# Patient Record
Sex: MALE | Race: WHITE | URBAN - METROPOLITAN AREA
[De-identification: names, ages, dates, MRNs, and addresses within clinical notes are randomized per-mention and may not be internally consistent; named-entity substitution may affect disease eponyms.]

---

## 2020-03-09 ENCOUNTER — OFFICE VISIT - HEALTHEAST (OUTPATIENT)
Dept: FAMILY MEDICINE | Facility: CLINIC | Age: 45
End: 2020-03-09

## 2020-03-09 DIAGNOSIS — Z76.89 ENCOUNTER TO ESTABLISH CARE: ICD-10-CM

## 2020-03-09 DIAGNOSIS — Z00.00 ENCOUNTER FOR GENERAL ADULT MEDICAL EXAMINATION WITHOUT ABNORMAL FINDINGS: ICD-10-CM

## 2020-03-09 DIAGNOSIS — Z13.220 ENCOUNTER FOR SCREENING FOR LIPOID DISORDERS: ICD-10-CM

## 2020-03-09 DIAGNOSIS — Z11.4 SCREENING FOR HIV (HUMAN IMMUNODEFICIENCY VIRUS): ICD-10-CM

## 2020-03-09 DIAGNOSIS — E78.5 HYPERLIPIDEMIA LDL GOAL <160: ICD-10-CM

## 2020-03-09 DIAGNOSIS — Z90.5 SINGLE KIDNEY: ICD-10-CM

## 2020-03-09 LAB
ALBUMIN SERPL-MCNC: 4.4 G/DL (ref 3.5–5)
ALP SERPL-CCNC: 82 U/L (ref 45–120)
ALT SERPL W P-5'-P-CCNC: 27 U/L (ref 0–45)
ANION GAP SERPL CALCULATED.3IONS-SCNC: 12 MMOL/L (ref 5–18)
AST SERPL W P-5'-P-CCNC: 16 U/L (ref 0–40)
BILIRUB SERPL-MCNC: 0.6 MG/DL (ref 0–1)
BUN SERPL-MCNC: 17 MG/DL (ref 8–22)
CALCIUM SERPL-MCNC: 10 MG/DL (ref 8.5–10.5)
CHLORIDE BLD-SCNC: 105 MMOL/L (ref 98–107)
CHOLEST SERPL-MCNC: 260 MG/DL
CO2 SERPL-SCNC: 24 MMOL/L (ref 22–31)
CREAT SERPL-MCNC: 0.93 MG/DL (ref 0.7–1.3)
FASTING STATUS PATIENT QL REPORTED: YES
GFR SERPL CREATININE-BSD FRML MDRD: >60 ML/MIN/1.73M2
GLUCOSE BLD-MCNC: 113 MG/DL (ref 70–125)
HDLC SERPL-MCNC: 39 MG/DL
HGB BLD-MCNC: 17.4 G/DL (ref 14–18)
HIV 1+2 AB+HIV1 P24 AG SERPL QL IA: NEGATIVE
LDLC SERPL CALC-MCNC: 167 MG/DL
POTASSIUM BLD-SCNC: 4.3 MMOL/L (ref 3.5–5)
PROT SERPL-MCNC: 7.6 G/DL (ref 6–8)
SODIUM SERPL-SCNC: 141 MMOL/L (ref 136–145)
TRIGL SERPL-MCNC: 272 MG/DL

## 2020-03-09 ASSESSMENT — MIFFLIN-ST. JEOR: SCORE: 2139.98

## 2020-03-10 ENCOUNTER — COMMUNICATION - HEALTHEAST (OUTPATIENT)
Dept: FAMILY MEDICINE | Facility: CLINIC | Age: 45
End: 2020-03-10

## 2020-03-11 ENCOUNTER — COMMUNICATION - HEALTHEAST (OUTPATIENT)
Dept: FAMILY MEDICINE | Facility: CLINIC | Age: 45
End: 2020-03-11

## 2020-06-30 ENCOUNTER — TRANSFERRED RECORDS (OUTPATIENT)
Dept: HEALTH INFORMATION MANAGEMENT | Facility: CLINIC | Age: 45
End: 2020-06-30

## 2020-11-06 ENCOUNTER — TRANSFERRED RECORDS (OUTPATIENT)
Dept: HEALTH INFORMATION MANAGEMENT | Facility: CLINIC | Age: 45
End: 2020-11-06

## 2021-06-04 VITALS
WEIGHT: 277.5 LBS | OXYGEN SATURATION: 97 % | BODY MASS INDEX: 39.73 KG/M2 | DIASTOLIC BLOOD PRESSURE: 99 MMHG | HEIGHT: 70 IN | SYSTOLIC BLOOD PRESSURE: 148 MMHG | HEART RATE: 62 BPM

## 2021-06-06 NOTE — TELEPHONE ENCOUNTER
Test Results  Who is calling?:  patient  Who ordered the test:  Fabienne Caldwell FNP  Type of test: Lab  Date of test:  3/20/20  Where was the test performed:  [facility]  What are your questions/concerns?:  Requesting results, relayed below results    Normal lab results except for elevated total cholesterol, triglycerides, HDL and LDL. I will recommend a mild to moderate dose statins. I will send this to your pharmacy and plan to recheck in 12 months.      Please call with questions or contact us using [employer].     Sincerely,          Electronically signed by CHARLENE Durant    Okay to leave a detailed message?:  Yes

## 2021-06-06 NOTE — PROGRESS NOTES
MALE PREVENTATIVE EXAM    Assessment and Plan:   1. Encounter to establish care  2. Encounter for general adult medical examination without abnormal findings  Healthy male exam  - Comprehensive Metabolic Panel  - Hemoglobin    3. Encounter for screening for lipoid disorders  - Lipid Cascade- FASTING    4. Single kidney  Plan to check renal function today .     5. Screening for HIV (human immunodeficiency virus)  - HIV Antigen/Antibody Screening [place]     Next follow up:  No follow-ups on file.    Immunization Review  Adult Imm Review: Due today, orders placed    I discussed the following with the patient:   Adult Healthy Living: Importance of regular exercise  Healthy nutrition  Getting adequate sleep  Stress management  Use of seat belts  Distracted driving  Helmets  Sporting equipment safety  Firearm safety  STI prevention  Contraception options  Supplement use  Herbal medications/alternative medical therapies    I have had an Advance Directives discussion with the patient.    Subjective:   Chief Complaint: Brandon Joy is an 45 y.o. male here for a preventative health visit.     HPI:  Patient reports that he is doing well but he has been told that his knees are bone to bone and that he will be needing surgery. He was given cortisone shot few weeks ago and he is not sure how long it going to last. 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 He denied any recent exposure to any form of illness. He denied chest pain, shortness of breath, fever and chills.     Healthy Habits  Are you taking a daily aspirin? No  Do you typically exercising at least 40 min, 3-4 times per week?  Yes  Do you usually eat at least 4 servings of fruit and vegetables a day, include whole grains and fiber and avoid regularly eating high fat foods? yes  Have you had an eye exam in the past two years? Yes  Do you see a dentist twice per year? NO  Do you have any concerns regarding sleep? No    Safety Screen  If you own firearms, are they  "secured in a locked gun cabinet or with trigger locks? The patient does not own any firearms  No data recorded    Review of Systems:  Please see above.  The rest of the review of systems are negative for all systems.     Cancer Screening     Patient has no health maintenance due at this time          Patient Care Team:  Fabienne Caldwell FNP as PCP - General (Nurse Practitioner)        History     Not marked as reviewed during this visit.            Objective:   Vital Signs: There were no vitals taken for this visit.       PHYSICAL EXAM  BP (!) 148/99   Pulse 62   Ht 5' 10\" (1.778 m)   Wt (!) 277 lb 8 oz (125.9 kg)   SpO2 97%   BMI 39.82 kg/m    General appearance: alert, appears stated age and cooperative  Head: Normocephalic, without obvious abnormality, atraumatic  Eyes: conjunctivae/corneas clear. PERRL, EOM's intact. Fundi benign.  Ears: normal TM's and external ear canals both ears  Throat: lips, mucosa, and tongue normal; teeth and gums normal  Neck: no adenopathy, no carotid bruit, no JVD, supple, symmetrical, trachea midline and thyroid not enlarged, symmetric, no tenderness/mass/nodules  Back: symmetric, no curvature. ROM normal. No CVA tenderness.  Lungs: clear to auscultation bilaterally  Chest wall: no tenderness  Heart: regular rate and rhythm, S1, S2 normal, no murmur, click, rub or gallop  Abdomen: soft, non-tender; bowel sounds normal; no masses,  no organomegaly  Male genitalia: normal, deferred  Rectal: deferred  Extremities: extremities normal, atraumatic, no cyanosis or edema  Pulses: 2+ and symmetric  Skin: Skin color, texture, turgor normal. No rashes or lesions  Lymph nodes: Cervical, supraclavicular, and axillary nodes normal.  Neurologic: Grossly normal      The ASCVD Risk score (Natalie DC Jr., et al., 2013) failed to calculate for the following reasons:    Cannot find a previous HDL lab    Cannot find a previous total cholesterol lab         Medication List      as of March 9, 2020  " 9:28 AM     You have not been prescribed any medications.         Additional Screenings Completed Today:

## 2021-06-18 NOTE — PATIENT INSTRUCTIONS - HE
Patient Instructions by Fabienne Caldwell FNP at 3/9/2020  8:10 AM     Author: Fabienne Caldwell FNP Service: -- Author Type: Nurse Practitioner    Filed: 3/9/2020  8:33 AM Encounter Date: 3/9/2020 Status: Signed    : Fabienne Caldwell FNP (Nurse Practitioner)           Preventing Skin Cancer     Use sunscreen of SPF 30 or greater. Apply liberally.   Relaxing in the sun may feel good. But it isnt good for your skin. In fact, the suns harmful rays are the major cause of skin cancer. This is a serious disease that can be life-threatening. People of all ages, races, and backgrounds are at risk.  Skin cancer is the most common cancer in the [place] But in most cases, it can be prevented.  Your role in prevention  You can act today to help prevent skin cancer. Start by avoiding the suns UV (ultraviolet) rays. And dont use tanning beds or lamps. They are no safer than the sun. Taking these steps can help keep you from getting skin cancer. It can also help prevent wrinkles and other aging effects caused by the sun. Make sure your children also follow these safeguards. Now is the time to start taking steps to prevent skin cancer.  When you are outdoors  Protect your skin when you go out during the day. Take safety steps whenever you go out to eat, run errands by car or on foot, or do any outdoor activity. There isnt just one easy way to protect your skin. Its best to follow all of these steps:    Wear tightly woven clothing that covers your skin. Put on a wide-brimmed hat to protect your face, ears, and scalp.    Watch the clock. Try to stay out of the sun between 10 a.m. and 4 p.m. That's when the sun's rays are strongest.    Head for the shade or create your own. Use an umbrella when sitting or strolling.    Know that the suns rays can reflect off sand, water, and snow. This can harm your skin. Take extra care when you are near reflective surfaces.    Keep in mind that even when the weather is hazy or cloudy,  "your skin can be exposed to strong UV rays.    Shield your skin with sunscreen. Also use sunscreen on your childrens skin. Keep babies younger than 6 months old out of the sun.  Tips for using sunscreen  To help prevent skin cancer, choose the right sunscreen and use it correctly. Try these tips:    Choose a sunscreen that has an SPF (sun protection factor) of at least 30. Also choose a sunscreen labeled \"broad spectrum. This will protect you from both UVA and UVB (ultraviolet A and B) rays.    If one brand irritates your skin, try another, such as one without fragrance.    Use a water-resistant sunscreen if you swim or sweat.    Use at least 1 ounce of sunscreen to cover exposed areas. This is enough to fill a shot glass. You might need to adjust the amount depending on your body size.    Put the sunscreen on dry skin about 15 minutes before going outdoors. This gives it time to soak in.    Reapply sunscreen every 2 hours. If youre active, do this more often.    Cover any sun-exposed skin, from your face to your feet. Dont forget your scalp, ears, and lips.    Know that while sunscreen helps protect you, it isnt enough. Sunscreens extend the length of time you can be outdoors before your skin starts to get red. But they don't give you total protection. Using sunscreen doesn't mean you can stay out in the sun for an unlimited time. Your skin cells are still being damaged. You should also wear protective clothing. And try to stay out of the sun as much as you can, especially from 10 a.m. to 4 p.m.  Date Last Reviewed: 7/1/2019 2000-2019 [employer]. [address] [place] [address], [place] [address]. All rights reserved. This information is not intended as a substitute for professional medical care. Always follow your healthcare professional's instructions.             "

## 2021-06-20 NOTE — LETTER
Letter by Fabienne Caldwell FNP at      Author: Fabienne Caldwell FNP Service: -- Author Type: --    Filed:  Encounter Date: 3/10/2020 Status: (Other)         Brandon LOPEZ Benita  [address] [place] [address]             March 10, 2020         Dear Mr. Joy,    Below are the results from your recent visit:    Resulted Orders   Lipid Cascade- FASTING   Result Value Ref Range    Cholesterol 260 (H) <=199 mg/dL    Triglycerides 272 (H) <=149 mg/dL    HDL Cholesterol 39 (L) >=40 mg/dL    LDL Calculated 167 (H) <=129 mg/dL    Patient Fasting > 8hrs? Yes    Comprehensive Metabolic Panel   Result Value Ref Range    Sodium 141 136 - 145 mmol/L    Potassium 4.3 3.5 - 5.0 mmol/L    Chloride 105 98 - 107 mmol/L    CO2 24 22 - 31 mmol/L    Anion Gap, Calculation 12 5 - 18 mmol/L    Glucose 113 70 - 125 mg/dL    BUN 17 8 - 22 mg/dL    Creatinine 0.93 0.70 - 1.30 mg/dL    GFR MDRD Af Amer >60 >60 mL/min/1.73m2    GFR MDRD Non Af Amer >60 >60 mL/min/1.73m2    Bilirubin, Total 0.6 0.0 - 1.0 mg/dL    Calcium 10.0 8.5 - 10.5 mg/dL    Protein, Total 7.6 6.0 - 8.0 g/dL    Albumin 4.4 3.5 - 5.0 g/dL    Alkaline Phosphatase 82 45 - 120 U/L    AST 16 0 - 40 U/L    ALT 27 0 - 45 U/L    Narrative    Fasting Glucose reference range is 70-99 mg/dL per  American Diabetes Association (ADA) guidelines.   Hemoglobin   Result Value Ref Range    Hemoglobin 17.4 14.0 - 18.0 g/dL   HIV Antigen/Antibody Screening [facility]   Result Value Ref Range    HIV Antigen / Antibody Negative Negative    Narrative    Method is Abbott HIV Ag/Ab for the detection of HIV p24 antigen, HIV-1 antibodies and HIV-2 antibodies.        Normal lab results except for elevated total cholesterol, triglycerides, HDL and LDL. I will recommend a  mild to moderate dose statins. I will send this to your pharmacy and plan to recheck in 12 months.     Please call with questions or contact us using [email or web address].    Sincerely,        Electronically signed by Fabienne Caldwell  FNP

## 2021-07-03 NOTE — ADDENDUM NOTE
Addendum Note by Fabienen Marie FNP at 3/9/2020  8:10 AM     Author: Fabienne Marie FNP Service: -- Author Type: Nurse Practitioner    Filed: 3/10/2020 10:45 AM Encounter Date: 3/9/2020 Status: Signed    : Fabienne Marie FNP (Nurse Practitioner)    Addended by: FABIENNE MARIE on: 3/10/2020 10:45 AM        Modules accepted: Orders

## 2021-07-03 NOTE — ADDENDUM NOTE
Addendum Note by Fabienne Marie FNP at 3/9/2020  8:10 AM     Author: Fabienne Marie FNP Service: -- Author Type: Nurse Practitioner    Filed: 3/9/2020 10:26 AM Encounter Date: 3/9/2020 Status: Signed    : Fabienne Marie FNP (Nurse Practitioner)    Addended by: FABIENNE MARIE on: 3/9/2020 10:26 AM        Modules accepted: Level of Service

## 2022-01-18 ENCOUNTER — TRANSFERRED RECORDS (OUTPATIENT)
Dept: HEALTH INFORMATION MANAGEMENT | Facility: CLINIC | Age: 47
End: 2022-01-18

## 2022-05-10 ENCOUNTER — TRANSFERRED RECORDS (OUTPATIENT)
Dept: HEALTH INFORMATION MANAGEMENT | Facility: CLINIC | Age: 47
End: 2022-05-10

## 2023-06-02 ENCOUNTER — TRANSFERRED RECORDS (OUTPATIENT)
Dept: HEALTH INFORMATION MANAGEMENT | Facility: CLINIC | Age: 48
End: 2023-06-02